# Patient Record
Sex: MALE | Race: WHITE | Employment: OTHER | URBAN - METROPOLITAN AREA
[De-identification: names, ages, dates, MRNs, and addresses within clinical notes are randomized per-mention and may not be internally consistent; named-entity substitution may affect disease eponyms.]

---

## 2017-01-27 ENCOUNTER — HOSPITAL ENCOUNTER (OUTPATIENT)
Dept: MRI IMAGING | Age: 72
Discharge: HOME | End: 2017-01-27
Attending: ORTHOPAEDIC SURGERY
Payer: MEDICARE

## 2017-01-27 DIAGNOSIS — M54.12 CERVICAL RADICULOPATHY: ICD-10-CM

## 2017-01-27 PROCEDURE — 72141 MRI NECK SPINE W/O DYE: CPT

## 2018-04-25 ENCOUNTER — OFFICE VISIT (OUTPATIENT)
Dept: INTERNAL MEDICINE CLINIC | Age: 73
End: 2018-04-25

## 2018-04-25 VITALS
OXYGEN SATURATION: 96 % | RESPIRATION RATE: 18 BRPM | BODY MASS INDEX: 25.4 KG/M2 | SYSTOLIC BLOOD PRESSURE: 116 MMHG | DIASTOLIC BLOOD PRESSURE: 63 MMHG | TEMPERATURE: 97.8 F | WEIGHT: 187.5 LBS | HEIGHT: 72 IN | HEART RATE: 82 BPM

## 2018-04-25 DIAGNOSIS — A69.20 LYME DISEASE: Primary | ICD-10-CM

## 2018-04-25 DIAGNOSIS — R76.8 POSITIVE LYME DISEASE SEROLOGY: ICD-10-CM

## 2018-04-25 RX ORDER — DOXYCYCLINE 100 MG/1
100 CAPSULE ORAL 2 TIMES DAILY
Qty: 56 CAP | Refills: 0 | Status: SHIPPED | OUTPATIENT
Start: 2018-04-25 | End: 2018-05-23

## 2018-04-25 RX ORDER — APIXABAN 5 MG/1
5 TABLET, FILM COATED ORAL 2 TIMES DAILY
COMMUNITY
Start: 2018-03-16

## 2018-04-25 RX ORDER — THYROID,PORK 32.5 MG
TABLET ORAL
Refills: 0 | COMMUNITY
Start: 2018-04-20 | End: 2019-05-16

## 2018-04-25 RX ORDER — PRASTERONE (DHEA) 25 MG
CAPSULE ORAL
Refills: 0 | COMMUNITY
Start: 2018-04-20 | End: 2019-05-16

## 2018-04-25 RX ORDER — FLUOCINONIDE 0.5 MG/G
CREAM TOPICAL
COMMUNITY
Start: 2018-02-06

## 2018-04-25 RX ORDER — FLUOCINONIDE 1 MG/G
CREAM TOPICAL
COMMUNITY
Start: 2018-02-08

## 2018-04-25 RX ORDER — TERBINAFINE HYDROCHLORIDE 250 MG/1
TABLET ORAL
Refills: 0 | COMMUNITY
Start: 2018-01-26

## 2018-04-25 NOTE — PROGRESS NOTES
RM 16      Chief Complaint   Patient presents with    New Patient     patient has lime disease        1. Have you been to the ER, urgent care clinic since your last visit? Hospitalized since your last visit? No    2. Have you seen or consulted any other health care providers outside of the [address] [place] [address] since your last visit? Include any pap smears or colon screening. Yes Reason for visit: Dr. Nikhil Veras, 3/10/18, lime disease     Health Maintenance Due   Topic Date Due    Hepatitis C Screening  1945    DTaP/Tdap/Td series (1 - Tdap) 07/23/1966    FOBT Q 1 YEAR AGE 50-75  07/23/1995    ZOSTER VACCINE AGE 60>  05/23/2005    GLAUCOMA SCREENING Q2Y  07/23/2010    Pneumococcal 65+ Low/Medium Risk (1 of 2 - PCV13) 07/23/2010    Influenza Age 9 to Adult  08/01/2017    MEDICARE YEARLY EXAM  03/20/2018     PHQ over the last two weeks 4/25/2018   Little interest or pleasure in doing things Not at all   Feeling down, depressed or hopeless Not at all   Total Score PHQ 2 0       Fall Risk Assessment, last 12 mths 4/25/2018   Able to walk? Yes   Fall in past 12 months?  No

## 2018-04-25 NOTE — MR AVS SNAPSHOT
[address] [facility] [address] 
[phone] Patient: Flex Doran MRN: [id number] OES:[birth year] Visit Information Date & Time Provider Department Dept. Phone Encounter #  
 4/25/2018  9:30 AM Jeannie Brunner, [facility] [phone] [id number] Follow-up Instructions Return if symptoms worsen or fail to improve. Upcoming Health Maintenance Date Due Hepatitis C Screening 1945 DTaP/Tdap/Td series (1 - Tdap) 7/23/1966 FOBT Q 1 YEAR AGE 50-75 7/23/1995 ZOSTER VACCINE AGE 60> 5/23/2005 GLAUCOMA SCREENING Q2Y 7/23/2010 Pneumococcal 65+ Low/Medium Risk (1 of 2 - PCV13) 7/23/2010 Influenza Age 5 to Adult 8/1/2017 MEDICARE YEARLY EXAM 3/20/2018 Allergies as of 4/25/2018  Review Complete On: 4/25/2018 By: Jeannie Brunner, MD  
 No Known Allergies Current Immunizations  Never Reviewed No immunizations on file. Not reviewed this visit You Were Diagnosed With   
  
 Codes Comments Lyme disease    -  Primary ICD-10-CM: A69.20 ICD-9-CM: 088.81 Positive Lyme disease serology     ICD-10-CM: R76.8 ICD-9-CM: 795.79 Vitals BP Pulse Temp Resp Height(growth percentile) Weight(growth percentile) 116/63 (BP 1 Location: Right arm, BP Patient Position: Sitting) 82 97.8 °F (36.6 °C) (Oral) 18 6' (1.829 m) 187 lb 8 oz (85 kg) SpO2 BMI Smoking Status 96% 25.43 kg/m2 Former Smoker BMI and BSA Data Body Mass Index Body Surface Area  
 25.43 kg/m 2 2.08 m 2 Preferred Pharmacy Pharmacy Name Phone [id number] [address] [phone] Your Updated Medication List  
  
   
This list is accurate as of 4/25/18 10:45 AM.  Always use your most recent med list. ALLEGRA PO Take  by mouth daily. aspirin 325 mg tablet Commonly known as:  ASPIRIN Take 325 mg by mouth daily. DHEA 25 mg Cap Generic drug:  prasterone (dhea) * dofetilide 250 mcg capsule Commonly known as:  Bethene Lente Take 250 mcg by mouth two (2) times a day. (with 125 mcg for total 375 mcg) * dofetilide 125 mcg capsule Commonly known as:  Bethene Lente Take 125 mcg by mouth two (2) times a day. (with 250 mcg for total 375 mcg) doxycycline 100 mg capsule Commonly known as:  VIBRAMYCIN Take 1 Cap by mouth two (2) times a day for 28 days. Fill if doxycycline monohydrate (Monodox) not covered. doxycycline 100 mg capsule Commonly known as:  Jovan Rice Take 1 Cap by mouth two (2) times a day for 28 days. ELIQUIS 5 mg tablet Generic drug:  apixaban  
  
 ferrous sulfate 325 mg (65 mg iron) tablet Take 325 mg by mouth two (2) times a day. FISH -1,000 mg capsule Generic drug:  fish oil-omega-3 fatty acids Take 1 Cap by mouth daily. FLONASE 50 mcg/actuation nasal spray Generic drug:  fluticasone 2 Sprays by Nasal route nightly. Administer to {[facility] RX NASAL each/right/left nostril:[id number]} nostril. * fluocinoNIDE 0.05 % topical cream  
Commonly known as:  LIDEX * fluocinonide 0.1 % topical cream  
Commonly known as:  VANOS  
  
 GLUCOSAMINE PO Take  by mouth daily. multivitamin tablet Commonly known as:  ONE A DAY Take 1 Tab by mouth daily. NATURE-THROID 32.5 mg tablet Generic drug:  thyroid (Pork) potassium chloride SR 10 mEq tablet Commonly known as:  KLOR-CON 10 Take 5 mEq by mouth daily. RESTASIS 0.05 % ophthalmic emulsion Generic drug:  cycloSPORINE Administer 1 Drop to both eyes two (2) times a day. sildenafil citrate 50 mg tablet Commonly known as:  VIAGRA Take 50 mg by mouth as needed. tavaborole 5 % Destiny  
by Apply Externally route daily. terbinafine HCl 250 mg tablet Commonly known as:  LAMISIL take 1 tablet by mouth once daily **HAVE LABWORK DRAWN AFTER DAY 45** * Notice: This list has 4 medication(s) that are the same as other medications prescribed for you. Read the directions carefully, and ask your doctor or other care provider to review them with you. Prescriptions Printed Refills  
 doxycycline (VIBRAMYCIN) 100 mg capsule 0 Sig: Take 1 Cap by mouth two (2) times a day for 28 days. Fill if doxycycline monohydrate (Monodox) not covered. Class: Print Route: Oral  
  
Prescriptions Sent to Pharmacy Refills  
 doxycycline (MONODOX) 100 mg capsule 0 Sig: Take 1 Cap by mouth two (2) times a day for 28 days. Class: Normal  
 [facility] [place] [address]  #: [phone] Route: Oral  
  
Follow-up Instructions Return if symptoms worsen or fail to improve. Patient Instructions 1. Complete at least 21 days of the doxycycline as reviewed. If the Monodox/monohydrate preparation is not covered, fill the doxycycline hyclate preparation instead (printed script). 2.  Will send copy of note to both PCP Dr. Nicci Corona and Dr. Roberta Adam as requested. 3.  Doxycycline Information/Reminders: Do not take Calcium-, Magnesium-, Aluminum-, or Iron-containing supplements (including OTC anti-acids, such as Tums, Maalox or Rolaids) or dairy products within 2 hours prior to or after the time the antibiotic medicine is taken--these compounds will keep the antibiotic from being absorbed. Doxycycline should be taken 2 hours prior to lying down/going to bed to prevent irritation of your esophagus from the medicine. Doxycycline makes you sun-sensitive--avoid sunlight and/or use sunscreen if exposed to the sun while taking the medicine. Typically take first daily dose between breakfast and lunch, and second daily dose between dinner and bedtime, to avoid food interactions. Introducing [facility]! [facility] introduces [email or web address] patient portal. Now you can access parts of your medical record, email your doctor's office, and request medication refills online. 1. In your internet browser, go to [email or web address] 2. Click on the First Time User? Click Here link in the Sign In box. You will see the New Member Sign Up page. 3. Enter your [email or web address] Access Code exactly as it appears below. You will not need to use this code after youve completed the sign-up process. If you do not sign up before the expiration date, you must request a new code. · [email or web address] Access Code: [email or web address] Expires: 7/24/2018 10:45 AM 
 
4. Enter the last four digits of your Social Security Number (xxxx) and Date of Birth (mm/dd/yyyy) as indicated and click Submit. You will be taken to the next sign-up page. 5. Create a [email or web address] ID. This will be your [email or web address] login ID and cannot be changed, so think of one that is secure and easy to remember. 6. Create a [email or web address] password. You can change your password at any time. 7. Enter your Password Reset Question and Answer. This can be used at a later time if you forget your password. 8. Enter your e-mail address. You will receive e-mail notification when new information is available in [address] 9. Click Sign Up. You can now view and download portions of your medical record. 10. Click the Download Summary menu link to download a portable copy of your medical information. If you have questions, please visit the Frequently Asked Questions section of the [email or web address] website. Remember, [email or web address] is NOT to be used for urgent needs. For medical emergencies, dial 911. Now available from your iPhone and Android! Please provide this summary of care documentation to your next provider. Your primary care clinician is listed as Sabra Us. If you have any questions after today's visit, please call [phone].

## 2018-04-25 NOTE — PATIENT INSTRUCTIONS
1.  Complete at least 21 days of the doxycycline as reviewed. If the Monodox/monohydrate preparation is not covered, fill the doxycycline hyclate preparation instead (printed script). 2.  Will send copy of note to both PCP Dr. Jose Alberto Becerra and Dr. Merly Cortes as requested. 3.  Doxycycline Information/Reminders:  Do not take Calcium-, Magnesium-, Aluminum-, or Iron-containing supplements (including OTC anti-acids, such as Tums, Maalox or Rolaids) or dairy products within 2 hours prior to or after the time the antibiotic medicine is taken--these compounds will keep the antibiotic from being absorbed. Doxycycline should be taken 2 hours prior to lying down/going to bed to prevent irritation of your esophagus from the medicine. Doxycycline makes you sun-sensitive--avoid sunlight and/or use sunscreen if exposed to the sun while taking the medicine. Typically take first daily dose between breakfast and lunch, and second daily dose between dinner and bedtime, to avoid food interactions.

## 2018-04-25 NOTE — PROGRESS NOTES
History of Present Illness:   Israel Tai is a 67 y.o. male here for evaluation:    Chief Complaint   Patient presents with    New Patient     patient has Lyme disease      Here a ID consult for recent dx of Lyme disease. He notes paperwork was to have been send over but not available to review at visit. He has copy of labs as reviewed below. He had labs with PCP on 2/28/18 with Lyme IgG (+) with confirmatory WB. His Lyme IgM was negative with negative WB. He notes done for chronic fatigue evaluation. He saw another provider for eval, and wants eval sent to them and PCP Dr. Milagro Franks. He has other labs with him at visit. --CRP 1.4 (normal <1.0). --Creat 1.37  --Normal sodium, AST, ALT. Normal globulin. --WBC 7.2; Hgb 12.1; Plt 157;  Neut 74%; Lymph 17%, EO 2%. Lyme testing was ordered as a direct Lyme Western Blot, without Ab screening. He notes remote history of negative Lyme testing--not sure when and not available to review. He reports multiple tick exposures from being outside in yard. He notes typically find prior to feeding/embedding/engorging. Notes sometimes attached/feeding. He has no history of Lyme-associated illness clinically. He has list of typical acute or chronic lyme manifestations/symptoms and denies any of these. Specifically:  --no history of acute or recurrent rash of ECM type. Only history skin itching  --no arthritis  --no neuropathy, CNS symptoms  --no history of meningoencephalitis. Notes memory problems, but these are not evaluated yet. Reviewed if evaluated by PCP, Neuro can consider implications of Lyme testing then, but would not treat for neuroborrelosis at this time with parenteral agents. He notes his arrhythmia is a fib and this has been managed effectively with his cardiologist.    Reviewed questions at visit.   Reviewed ideally would have RAVEN or other antibody screening prior to WB testing, but since testing for WB so strong with multiple bands (+), that a negative RAVEN test would not necessarily change recommended mgt below. Recommended therapy as below. He has no problems typically with abx. Past Medical History:   Diagnosis Date    Anemia     chronic \"most all my life\"    Arrhythmia     atrial tachycardia    Arthritis     CAD (coronary artery disease)     afib, mitral valve prolapse    Sleep apnea     uses BiPAP        Prior to Admission medications    Medication Sig Start Date End Date Taking? Authorizing Provider   ELIQUIS 5 mg tablet  3/16/18  Yes Historical Provider   fluocinoNIDE (LIDEX) 0.05 % topical cream  2/6/18  Yes Historical Provider   fluocinonide (VANOS) 0.1 % topical cream  2/8/18  Yes Historical Provider   DHEA 25 mg cap  4/20/18  Yes Historical Provider   NATURE-THROID 32.5 mg tablet  4/20/18  Yes Historical Provider   terbinafine HCl (LAMISIL) 250 mg tablet take 1 tablet by mouth once daily **HAVE LABWORK DRAWN AFTER DAY 45** 1/26/18  Yes Historical Provider   dofetilide (TIKOSYN) 250 mcg capsule Take 250 mcg by mouth two (2) times a day. (with 125 mcg for total 375 mcg)   Yes Historical Provider   dofetilide (TIKOSYN) 125 mcg capsule Take 125 mcg by mouth two (2) times a day. (with 250 mcg for total 375 mcg)   Yes Historical Provider   ferrous sulfate 325 mg (65 mg iron) tablet Take 325 mg by mouth two (2) times a day. Yes Historical Provider   multivitamin (ONE A DAY) tablet Take 1 Tab by mouth daily. Yes Historical Provider   tavaborole 5 % brando by Apply Externally route daily. Yes Historical Provider   potassium chloride SR (KLOR-CON 10) 10 mEq tablet Take 5 mEq by mouth daily. Yes Historical Provider   sildenafil citrate (VIAGRA) 50 mg tablet Take 50 mg by mouth as needed. Yes Historical Provider   cyclosporine (RESTASIS) 0.05 % ophthalmic emulsion Administer 1 Drop to both eyes two (2) times a day.    Yes Historical Provider   fluticasone (FLONASE) 50 mcg/Actuation nasal spray 2 Sprays by Nasal route nightly. Administer to  nostril. Yes Historical Provider   fish oil-omega-3 fatty acids (FISH OIL) 340-1,000 mg capsule Take 1 Cap by mouth daily. Yes Historical Provider   GLUCOSAMINE SULFATE (GLUCOSAMINE PO) Take  by mouth daily. Yes Historical Provider   FEXOFENADINE HCL (ALLEGRA PO) Take  by mouth daily. Yes Historical Provider   aspirin (ASPIRIN) 325 mg tablet Take 325 mg by mouth daily. Historical Provider        ROS  Complete ROS negative except as indicated in note. Vitals:    04/25/18 0924   BP: 116/63   Pulse: 82   Resp: 18   Temp: 97.8 °F (36.6 °C)   TempSrc: Oral   SpO2: 96%   Weight: 187 lb 8 oz (85 kg)   Height: 6' (1.829 m)   PainSc:   0 - No pain      Body mass index is 25.43 kg/(m^2). Physical Exam:     Physical Exam   Constitutional: He appears well-developed and well-nourished. No distress. HENT:   Head: Normocephalic and atraumatic. Mouth/Throat: Oropharynx is clear and moist. No oropharyngeal exudate. Eyes: Conjunctivae are normal. Right eye exhibits no discharge. Left eye exhibits no discharge. No scleral icterus. Neck: Normal range of motion. Neck supple. No tracheal deviation present. No thyromegaly present. Cardiovascular: Normal rate, regular rhythm, normal heart sounds and intact distal pulses. Exam reveals no gallop and no friction rub. No murmur heard. Pulmonary/Chest: Effort normal and breath sounds normal. No stridor. No respiratory distress. He has no wheezes. He has no rales. Abdominal: Soft. Bowel sounds are normal. He exhibits no distension. There is no tenderness. There is no rebound and no guarding. Musculoskeletal: He exhibits no edema or tenderness. No joint pain or large joint effusions noted. Lymphadenopathy:     He has no cervical adenopathy. Neurological: He is alert. He exhibits normal muscle tone. Coordination normal.   Skin: Skin is warm. No rash noted. He is not diaphoretic. No erythema. No pallor.    No rash noted (limited skin exam). Psychiatric: He has a normal mood and affect. His behavior is normal. Judgment and thought content normal.           Assessment and Plan:       ICD-10-CM ICD-9-CM    1. Lyme disease A69.20 088.81    2. Positive Lyme disease serology R76.8 795.79        1,2:  Since he has strong Western Blot (WB) evidence of prior Lyme infection without history of treatment or clinical illness associated with Lyme, recommend oral treatment with doxycycline. Reviewed Monodox or monohydrate preparation as preferred choice for better GI tolerance, since planning 28-day course of 100mg BID therapy, but Monodox not covered with electronic formulary decision support. Printed Doxy hyclate prep in case Monodox not covered/not affordable. He will fill instead as reviewed. Reviewed no further testing after treatment, as his serology will not change. Questions reviewed with pt at visit. Reviewed if problems with 28-day course, he can complete minimum 21 days. Reviewed alternatives PO if not tolerated. Reviewed if memory concerns evaluated, can have neuro evaluate in light of Lyme history, but do not think his mild intermittent joint pains or memory concerns at this time represent arthritis or neurologic manifestations of Lyme disease. Follow-up Disposition:  Return if symptoms worsen or fail to improve.  lab results and schedule of future lab studies reviewed with patient  reviewed medications and side effects in detail    For additional documentation of information and/or recommendations discussed this visit, please see notes in instructions. Plan and evaluation (above) reviewed with pt at visit  Patient voiced understanding of plan and provided with time to ask/review questions. After Visit Summary (AVS) provided to pt after visit with additional instructions as needed/reviewed. Pt requested note be sent to PCP Dr. Kimberlyn Flores once signed. Royer Benz

## 2018-11-29 ENCOUNTER — HOSPITAL ENCOUNTER (OUTPATIENT)
Dept: GENERAL RADIOLOGY | Age: 73
Discharge: HOME | End: 2018-11-29
Payer: MEDICARE

## 2018-11-29 DIAGNOSIS — M48.02 CERVICAL SPINAL STENOSIS: ICD-10-CM

## 2018-11-29 DIAGNOSIS — M48.02 FORAMINAL STENOSIS OF CERVICAL REGION: ICD-10-CM

## 2018-11-29 DIAGNOSIS — M54.2 NECK PAIN: ICD-10-CM

## 2018-11-29 PROCEDURE — 72040 X-RAY EXAM NECK SPINE 2-3 VW: CPT

## 2019-04-30 ENCOUNTER — HOSPITAL ENCOUNTER (OUTPATIENT)
Dept: CARDIAC REHAB | Age: 74
Discharge: HOME | End: 2019-04-30
Payer: MEDICARE

## 2019-04-30 VITALS — WEIGHT: 188.4 LBS | BODY MASS INDEX: 25.55 KG/M2

## 2019-04-30 PROCEDURE — 93798 PHYS/QHP OP CAR RHAB W/ECG: CPT

## 2019-04-30 NOTE — CARDIO/PULMONARY
Met with Mr. Pedro Silva for initia pulmonary visit. . Mr. Lanis Bumpers is a 68year old  patient of Dr. Astrid Taylor who presents with a Pulmonary Hypertension. History of arthritis and CAD. LVEF 56% 
  
Limitations to exercise are primarily related to deconditioning  
   
He currently is not exercising at home.  
   
He competed the 6 minute walk test on the treadmill, walking 310 meters, mets 2.5, RPE 11and RPD 0.  He denied any symptoms during his 6 min walk test  
   
Psychosocial: Pt scored a 3 on his PHQ9. He is a retired . He is  and enjoys gardening. He does have a son that can help him and also has a lawn service.   
  
Patient was given an overview of cardiac rehab program, placement of electrode/leads, and rationale for program. 
   
Heart rhythm on the monitor was A-fib with a BBB. Oxygen saturation was 97% on room air. Patient's identified goals are 1. To decrease shortness of breath. 2.  To gain more energy. I feel it would be beneficial to have patient on the cardiac monitor even though he is pulmonary.

## 2019-04-30 NOTE — CARDIO/PULMONARY
COPD Assessment Tool (CAT)     0-5 I never cough/I cough all the time       Score:0 I have no phlegm in my chest/ My chest is completely full of phlegm  Score: 0 My chest does not feel tight at all/ My chest feels very tight   Score:0 When I walk up a hill or stairs I am bot breathless/ 3 When I walk up a hill or stairs I am very breathless    Score:3 I am not limited doing any activities at home/ 
 I am very limited doing activities at home     Score:0 I am confident leaving my home despite my lung condition/ 
I am not at all confident leaving my home because of my lung condition Score:0 I sleep soundly/ I don't sleep because of my lung condition   Score:0 I have lots of energy/ I have no energy at all     Score:5            Total:8

## 2019-05-02 ENCOUNTER — HOSPITAL ENCOUNTER (OUTPATIENT)
Dept: CARDIAC REHAB | Age: 74
End: 2019-05-02

## 2019-05-07 ENCOUNTER — APPOINTMENT (OUTPATIENT)
Dept: CARDIAC REHAB | Age: 74
End: 2019-05-07

## 2019-05-09 ENCOUNTER — APPOINTMENT (OUTPATIENT)
Dept: CARDIAC REHAB | Age: 74
End: 2019-05-09

## 2019-05-14 ENCOUNTER — APPOINTMENT (OUTPATIENT)
Dept: CARDIAC REHAB | Age: 74
End: 2019-05-14

## 2019-05-16 ENCOUNTER — APPOINTMENT (OUTPATIENT)
Dept: CARDIAC REHAB | Age: 74
End: 2019-05-16

## 2019-05-21 ENCOUNTER — APPOINTMENT (OUTPATIENT)
Dept: CARDIAC REHAB | Age: 74
End: 2019-05-21

## 2019-05-23 ENCOUNTER — APPOINTMENT (OUTPATIENT)
Dept: CARDIAC REHAB | Age: 74
End: 2019-05-23

## 2019-05-28 ENCOUNTER — APPOINTMENT (OUTPATIENT)
Dept: CARDIAC REHAB | Age: 74
End: 2019-05-28

## 2019-05-30 ENCOUNTER — APPOINTMENT (OUTPATIENT)
Dept: CARDIAC REHAB | Age: 74
End: 2019-05-30

## 2019-06-04 ENCOUNTER — APPOINTMENT (OUTPATIENT)
Dept: CARDIAC REHAB | Age: 74
End: 2019-06-04
Payer: MEDICARE

## 2019-06-06 ENCOUNTER — APPOINTMENT (OUTPATIENT)
Dept: CARDIAC REHAB | Age: 74
End: 2019-06-06
Payer: MEDICARE

## 2019-06-10 ENCOUNTER — HOSPITAL ENCOUNTER (OUTPATIENT)
Dept: CARDIAC REHAB | Age: 74
Discharge: HOME | End: 2019-06-10
Payer: MEDICARE

## 2019-06-10 ENCOUNTER — APPOINTMENT (OUTPATIENT)
Dept: CARDIAC REHAB | Age: 74
End: 2019-06-10
Payer: MEDICARE

## 2019-06-10 VITALS — WEIGHT: 191.4 LBS | BODY MASS INDEX: 26.69 KG/M2

## 2019-06-10 PROCEDURE — G0237 THERAPEUTIC PROCD STRG ENDUR: HCPCS

## 2019-06-11 ENCOUNTER — APPOINTMENT (OUTPATIENT)
Dept: CARDIAC REHAB | Age: 74
End: 2019-06-11
Payer: MEDICARE

## 2019-06-13 ENCOUNTER — HOSPITAL ENCOUNTER (OUTPATIENT)
Dept: CARDIAC REHAB | Age: 74
Discharge: HOME | End: 2019-06-13
Payer: MEDICARE

## 2019-06-13 ENCOUNTER — APPOINTMENT (OUTPATIENT)
Dept: CARDIAC REHAB | Age: 74
End: 2019-06-13
Payer: MEDICARE

## 2019-06-13 PROCEDURE — G0239 OTH RESP PROC, GROUP: HCPCS

## 2019-06-17 ENCOUNTER — APPOINTMENT (OUTPATIENT)
Dept: CARDIAC REHAB | Age: 74
End: 2019-06-17
Payer: MEDICARE

## 2019-06-18 ENCOUNTER — HOSPITAL ENCOUNTER (OUTPATIENT)
Dept: CARDIAC REHAB | Age: 74
Discharge: HOME | End: 2019-06-18
Payer: MEDICARE

## 2019-06-18 ENCOUNTER — APPOINTMENT (OUTPATIENT)
Dept: CARDIAC REHAB | Age: 74
End: 2019-06-18
Payer: MEDICARE

## 2019-06-18 VITALS — WEIGHT: 190.6 LBS | BODY MASS INDEX: 26.58 KG/M2

## 2019-06-18 PROCEDURE — G0239 OTH RESP PROC, GROUP: HCPCS

## 2019-06-20 ENCOUNTER — APPOINTMENT (OUTPATIENT)
Dept: CARDIAC REHAB | Age: 74
End: 2019-06-20
Payer: MEDICARE

## 2019-06-20 ENCOUNTER — HOSPITAL ENCOUNTER (OUTPATIENT)
Dept: CARDIAC REHAB | Age: 74
Discharge: HOME | End: 2019-06-20
Payer: MEDICARE

## 2019-06-20 VITALS — WEIGHT: 189 LBS | BODY MASS INDEX: 26.36 KG/M2

## 2019-06-20 PROCEDURE — G0239 OTH RESP PROC, GROUP: HCPCS

## 2019-06-24 ENCOUNTER — APPOINTMENT (OUTPATIENT)
Dept: CARDIAC REHAB | Age: 74
End: 2019-06-24
Payer: MEDICARE

## 2019-06-25 ENCOUNTER — APPOINTMENT (OUTPATIENT)
Dept: CARDIAC REHAB | Age: 74
End: 2019-06-25
Payer: MEDICARE

## 2019-06-25 ENCOUNTER — HOSPITAL ENCOUNTER (OUTPATIENT)
Dept: CARDIAC REHAB | Age: 74
Discharge: HOME | End: 2019-06-25
Payer: MEDICARE

## 2019-06-25 VITALS — WEIGHT: 190 LBS | BODY MASS INDEX: 26.5 KG/M2

## 2019-06-25 PROCEDURE — G0239 OTH RESP PROC, GROUP: HCPCS

## 2019-06-27 ENCOUNTER — HOSPITAL ENCOUNTER (OUTPATIENT)
Dept: CARDIAC REHAB | Age: 74
End: 2019-06-27
Payer: MEDICARE

## 2019-06-27 ENCOUNTER — APPOINTMENT (OUTPATIENT)
Dept: CARDIAC REHAB | Age: 74
End: 2019-06-27
Payer: MEDICARE

## 2019-06-27 ENCOUNTER — HOSPITAL ENCOUNTER (OUTPATIENT)
Dept: CARDIAC REHAB | Age: 74
Discharge: HOME | End: 2019-06-27
Payer: MEDICARE

## 2019-06-27 VITALS — BODY MASS INDEX: 26.25 KG/M2 | WEIGHT: 188.2 LBS

## 2019-06-27 PROCEDURE — G0239 OTH RESP PROC, GROUP: HCPCS

## 2019-07-01 ENCOUNTER — APPOINTMENT (OUTPATIENT)
Dept: CARDIAC REHAB | Age: 74
End: 2019-07-01
Payer: MEDICARE

## 2019-07-08 ENCOUNTER — APPOINTMENT (OUTPATIENT)
Dept: CARDIAC REHAB | Age: 74
End: 2019-07-08
Payer: MEDICARE

## 2019-07-09 ENCOUNTER — HOSPITAL ENCOUNTER (OUTPATIENT)
Dept: CARDIAC REHAB | Age: 74
Discharge: HOME | End: 2019-07-09
Payer: MEDICARE

## 2019-07-09 VITALS — WEIGHT: 192.4 LBS | BODY MASS INDEX: 26.83 KG/M2

## 2019-07-09 PROCEDURE — G0239 OTH RESP PROC, GROUP: HCPCS

## 2019-07-11 ENCOUNTER — HOSPITAL ENCOUNTER (OUTPATIENT)
Dept: CARDIAC REHAB | Age: 74
Discharge: HOME | End: 2019-07-11
Payer: MEDICARE

## 2019-07-11 VITALS — WEIGHT: 182.8 LBS | BODY MASS INDEX: 25.5 KG/M2

## 2019-07-11 PROCEDURE — G0239 OTH RESP PROC, GROUP: HCPCS

## 2019-07-15 ENCOUNTER — APPOINTMENT (OUTPATIENT)
Dept: CARDIAC REHAB | Age: 74
End: 2019-07-15
Payer: MEDICARE

## 2019-07-16 ENCOUNTER — HOSPITAL ENCOUNTER (OUTPATIENT)
Dept: CARDIAC REHAB | Age: 74
Discharge: HOME | End: 2019-07-16
Payer: MEDICARE

## 2019-07-16 VITALS — WEIGHT: 190.8 LBS | BODY MASS INDEX: 26.61 KG/M2

## 2019-07-16 PROCEDURE — G0239 OTH RESP PROC, GROUP: HCPCS

## 2019-07-18 ENCOUNTER — HOSPITAL ENCOUNTER (OUTPATIENT)
Dept: CARDIAC REHAB | Age: 74
Discharge: HOME | End: 2019-07-18
Payer: MEDICARE

## 2019-07-18 VITALS — WEIGHT: 191.4 LBS | BODY MASS INDEX: 26.69 KG/M2

## 2019-07-18 PROCEDURE — G0239 OTH RESP PROC, GROUP: HCPCS

## 2019-07-22 ENCOUNTER — APPOINTMENT (OUTPATIENT)
Dept: CARDIAC REHAB | Age: 74
End: 2019-07-22
Payer: MEDICARE

## 2019-07-23 ENCOUNTER — HOSPITAL ENCOUNTER (OUTPATIENT)
Dept: CARDIAC REHAB | Age: 74
Discharge: HOME | End: 2019-07-23
Payer: MEDICARE

## 2019-07-23 VITALS — WEIGHT: 190.4 LBS | BODY MASS INDEX: 26.56 KG/M2

## 2019-07-23 PROCEDURE — G0239 OTH RESP PROC, GROUP: HCPCS

## 2019-07-25 ENCOUNTER — APPOINTMENT (OUTPATIENT)
Dept: CARDIAC REHAB | Age: 74
End: 2019-07-25
Payer: MEDICARE

## 2019-07-25 ENCOUNTER — HOSPITAL ENCOUNTER (OUTPATIENT)
Dept: CARDIAC REHAB | Age: 74
Discharge: HOME | End: 2019-07-25
Payer: MEDICARE

## 2019-07-25 VITALS — BODY MASS INDEX: 26.5 KG/M2 | WEIGHT: 190 LBS

## 2019-07-25 PROCEDURE — G0239 OTH RESP PROC, GROUP: HCPCS

## 2019-07-29 ENCOUNTER — APPOINTMENT (OUTPATIENT)
Dept: CARDIAC REHAB | Age: 74
End: 2019-07-29
Payer: MEDICARE

## 2019-08-01 ENCOUNTER — APPOINTMENT (OUTPATIENT)
Dept: CARDIAC REHAB | Age: 74
End: 2019-08-01
Payer: MEDICARE

## 2019-08-05 ENCOUNTER — APPOINTMENT (OUTPATIENT)
Dept: CARDIAC REHAB | Age: 74
End: 2019-08-05
Payer: MEDICARE

## 2019-08-06 ENCOUNTER — HOSPITAL ENCOUNTER (OUTPATIENT)
Dept: CARDIAC REHAB | Age: 74
Discharge: HOME | End: 2019-08-06
Payer: MEDICARE

## 2019-08-06 VITALS — WEIGHT: 189.4 LBS | BODY MASS INDEX: 26.42 KG/M2

## 2019-08-06 PROCEDURE — G0239 OTH RESP PROC, GROUP: HCPCS

## 2019-08-08 ENCOUNTER — HOSPITAL ENCOUNTER (OUTPATIENT)
Dept: CARDIAC REHAB | Age: 74
Discharge: HOME | End: 2019-08-08
Payer: MEDICARE

## 2019-08-08 VITALS — WEIGHT: 186.8 LBS | BODY MASS INDEX: 26.05 KG/M2

## 2019-08-08 PROCEDURE — G0239 OTH RESP PROC, GROUP: HCPCS

## 2019-08-13 ENCOUNTER — HOSPITAL ENCOUNTER (OUTPATIENT)
Dept: CARDIAC REHAB | Age: 74
Discharge: HOME | End: 2019-08-13
Payer: MEDICARE

## 2019-08-13 VITALS — BODY MASS INDEX: 26.36 KG/M2 | WEIGHT: 189 LBS

## 2019-08-13 PROCEDURE — G0239 OTH RESP PROC, GROUP: HCPCS

## 2019-08-15 ENCOUNTER — HOSPITAL ENCOUNTER (OUTPATIENT)
Dept: CARDIAC REHAB | Age: 74
Discharge: HOME | End: 2019-08-15
Payer: MEDICARE

## 2019-08-15 VITALS — BODY MASS INDEX: 26.69 KG/M2 | WEIGHT: 191.4 LBS

## 2019-08-15 PROCEDURE — G0239 OTH RESP PROC, GROUP: HCPCS

## 2019-08-20 ENCOUNTER — HOSPITAL ENCOUNTER (OUTPATIENT)
Dept: CARDIAC REHAB | Age: 74
Discharge: HOME | End: 2019-08-20
Payer: MEDICARE

## 2019-08-20 VITALS — BODY MASS INDEX: 26.5 KG/M2 | WEIGHT: 190 LBS

## 2019-08-20 PROCEDURE — G0239 OTH RESP PROC, GROUP: HCPCS

## 2019-08-22 ENCOUNTER — HOSPITAL ENCOUNTER (OUTPATIENT)
Dept: CARDIAC REHAB | Age: 74
Discharge: HOME | End: 2019-08-22
Payer: MEDICARE

## 2019-08-22 VITALS — WEIGHT: 192.2 LBS | BODY MASS INDEX: 26.81 KG/M2

## 2019-08-22 PROCEDURE — G0239 OTH RESP PROC, GROUP: HCPCS

## 2019-08-27 ENCOUNTER — HOSPITAL ENCOUNTER (OUTPATIENT)
Dept: CARDIAC REHAB | Age: 74
Discharge: HOME | End: 2019-08-27
Payer: MEDICARE

## 2019-08-27 VITALS — WEIGHT: 190.8 LBS | BODY MASS INDEX: 26.61 KG/M2

## 2019-08-27 PROCEDURE — G0239 OTH RESP PROC, GROUP: HCPCS

## 2019-08-29 ENCOUNTER — APPOINTMENT (OUTPATIENT)
Dept: CARDIAC REHAB | Age: 74
End: 2019-08-29
Payer: MEDICARE

## 2019-09-03 ENCOUNTER — HOSPITAL ENCOUNTER (OUTPATIENT)
Dept: CARDIAC REHAB | Age: 74
Discharge: HOME | End: 2019-09-03
Payer: MEDICARE

## 2019-09-03 VITALS — WEIGHT: 192.8 LBS | BODY MASS INDEX: 26.89 KG/M2

## 2019-09-03 PROCEDURE — G0239 OTH RESP PROC, GROUP: HCPCS

## 2019-09-05 ENCOUNTER — HOSPITAL ENCOUNTER (OUTPATIENT)
Dept: CARDIAC REHAB | Age: 74
Discharge: HOME | End: 2019-09-05
Payer: MEDICARE

## 2019-09-05 VITALS — WEIGHT: 192.4 LBS | BODY MASS INDEX: 26.83 KG/M2

## 2019-09-05 PROCEDURE — G0239 OTH RESP PROC, GROUP: HCPCS

## 2019-09-10 ENCOUNTER — HOSPITAL ENCOUNTER (OUTPATIENT)
Dept: CARDIAC REHAB | Age: 74
Discharge: HOME | End: 2019-09-10
Payer: MEDICARE

## 2019-09-10 ENCOUNTER — APPOINTMENT (OUTPATIENT)
Dept: GENERAL RADIOLOGY | Age: 74
End: 2019-09-10
Attending: EMERGENCY MEDICINE
Payer: MEDICARE

## 2019-09-10 ENCOUNTER — HOSPITAL ENCOUNTER (EMERGENCY)
Age: 74
Discharge: HOME | End: 2019-09-10
Attending: EMERGENCY MEDICINE
Payer: MEDICARE

## 2019-09-10 VITALS
HEIGHT: 72 IN | OXYGEN SATURATION: 100 % | SYSTOLIC BLOOD PRESSURE: 138 MMHG | RESPIRATION RATE: 16 BRPM | DIASTOLIC BLOOD PRESSURE: 58 MMHG | TEMPERATURE: 97.4 F | BODY MASS INDEX: 25.68 KG/M2 | HEART RATE: 73 BPM | WEIGHT: 189.6 LBS

## 2019-09-10 DIAGNOSIS — R07.9 CHEST PAIN, UNSPECIFIED TYPE: Primary | ICD-10-CM

## 2019-09-10 LAB
ALBUMIN SERPL-MCNC: 4.1 G/DL (ref 3.5–5)
ALBUMIN/GLOB SERPL: 1.2 {RATIO} (ref 1.1–2.2)
ALP SERPL-CCNC: 112 U/L (ref 45–117)
ALT SERPL-CCNC: 34 U/L (ref 12–78)
ANION GAP SERPL CALC-SCNC: 5 MMOL/L (ref 5–15)
AST SERPL-CCNC: 23 U/L (ref 15–37)
BASOPHILS # BLD: 0 K/UL (ref 0–0.1)
BASOPHILS NFR BLD: 1 % (ref 0–1)
BILIRUB SERPL-MCNC: 2.2 MG/DL (ref 0.2–1)
BUN SERPL-MCNC: 22 MG/DL (ref 6–20)
BUN/CREAT SERPL: 17 (ref 12–20)
CALCIUM SERPL-MCNC: 9 MG/DL (ref 8.5–10.1)
CHLORIDE SERPL-SCNC: 105 MMOL/L (ref 97–108)
CO2 SERPL-SCNC: 29 MMOL/L (ref 21–32)
CREAT SERPL-MCNC: 1.28 MG/DL (ref 0.7–1.3)
DIFFERENTIAL METHOD BLD: ABNORMAL
EOSINOPHIL # BLD: 0.1 K/UL (ref 0–0.4)
EOSINOPHIL NFR BLD: 2 % (ref 0–7)
ERYTHROCYTE [DISTWIDTH] IN BLOOD BY AUTOMATED COUNT: 13.4 % (ref 11.5–14.5)
GLOBULIN SER CALC-MCNC: 3.3 G/DL (ref 2–4)
GLUCOSE SERPL-MCNC: 88 MG/DL (ref 65–100)
HCT VFR BLD AUTO: 35.2 % (ref 36.6–50.3)
HGB BLD-MCNC: 11.7 G/DL (ref 12.1–17)
IMM GRANULOCYTES # BLD AUTO: 0 K/UL (ref 0–0.04)
IMM GRANULOCYTES NFR BLD AUTO: 0 % (ref 0–0.5)
LYMPHOCYTES # BLD: 0.8 K/UL (ref 0.8–3.5)
LYMPHOCYTES NFR BLD: 14 % (ref 12–49)
MAGNESIUM SERPL-MCNC: 2.1 MG/DL (ref 1.6–2.4)
MCH RBC QN AUTO: 32.4 PG (ref 26–34)
MCHC RBC AUTO-ENTMCNC: 33.2 G/DL (ref 30–36.5)
MCV RBC AUTO: 97.5 FL (ref 80–99)
MONOCYTES # BLD: 0.4 K/UL (ref 0–1)
MONOCYTES NFR BLD: 7 % (ref 5–13)
NEUTS SEG # BLD: 4.6 K/UL (ref 1.8–8)
NEUTS SEG NFR BLD: 76 % (ref 32–75)
NRBC # BLD: 0 K/UL (ref 0–0.01)
NRBC BLD-RTO: 0 PER 100 WBC
PLATELET # BLD AUTO: 115 K/UL (ref 150–400)
PMV BLD AUTO: 10.2 FL (ref 8.9–12.9)
POTASSIUM SERPL-SCNC: 4 MMOL/L (ref 3.5–5.1)
PROT SERPL-MCNC: 7.4 G/DL (ref 6.4–8.2)
RBC # BLD AUTO: 3.61 M/UL (ref 4.1–5.7)
SODIUM SERPL-SCNC: 139 MMOL/L (ref 136–145)
TROPONIN I SERPL-MCNC: <0.05 NG/ML
TROPONIN I SERPL-MCNC: <0.05 NG/ML
WBC # BLD AUTO: 6 K/UL (ref 4.1–11.1)

## 2019-09-10 PROCEDURE — 93005 ELECTROCARDIOGRAM TRACING: CPT

## 2019-09-10 PROCEDURE — 83735 ASSAY OF MAGNESIUM: CPT

## 2019-09-10 PROCEDURE — 36415 COLL VENOUS BLD VENIPUNCTURE: CPT

## 2019-09-10 PROCEDURE — G0239 OTH RESP PROC, GROUP: HCPCS

## 2019-09-10 PROCEDURE — 85025 COMPLETE CBC W/AUTO DIFF WBC: CPT

## 2019-09-10 PROCEDURE — 99284 EMERGENCY DEPT VISIT MOD MDM: CPT

## 2019-09-10 PROCEDURE — 80053 COMPREHEN METABOLIC PANEL: CPT

## 2019-09-10 PROCEDURE — 84484 ASSAY OF TROPONIN QUANT: CPT

## 2019-09-10 PROCEDURE — 71046 X-RAY EXAM CHEST 2 VIEWS: CPT

## 2019-09-10 NOTE — ED NOTES
Report received from Briseida Cueto [facility]. They advised of the patient's chief complaint, current status, orders completed (to include IV access/medications/radiology testing), outstanding orders that still need to be completed, and the treatment plan. Questions asked and answered prior to assumption of care.

## 2019-09-10 NOTE — DISCHARGE INSTRUCTIONS

## 2019-09-10 NOTE — ED TRIAGE NOTES
Had episode of mild chest pain yesterday which resolved. Went to cardiac rehab today and had another episode of chest pain. Hx Afib and cardiomyopathy so sent to ED for further evaluation.   Slight left sided chest pain

## 2019-09-10 NOTE — ED PROVIDER NOTES
EMERGENCY DEPARTMENT HISTORY AND PHYSICAL EXAM      Date: 9/10/2019  Patient Name: Rashmi Bishop    History of Presenting Illness     Chief Complaint   Patient presents with    Chest Pain       History Provided By: Patient    HPI: Rashmi Bishop, 76 y.o. male with PMHx as noted below presents the emergency department with chief complaint of chest pain. Patient states that he has had intermittent left-sided chest pain since yesterday that is worse with exertion. Patient noted the pain again returned during cardiac rehab just prior to arrival and has been constant since. He was sent here from cardiac rehab for evaluation. Otherwise notes no shortness of breath, radiation of symptoms, nausea, vomiting, diaphoresis. Patient describes the pain as a mild dull ache.       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 Seng Lima Sleep apnea     uses BiPAP    SOB (shortness of breath)     chronic; dyspnea on exertion; Dr. Mickey Mercer       Past Surgical History:  Past Surgical History:   Procedure Laterality Date    CARDIAC SURG PROCEDURE UNLIST  ~2018    cardiac ablation    CARDIAC SURG PROCEDURE UNLIST  ~2013    cardiac ablation    HX BACK SURGERY      disc's removed at age 34    HX BACK SURGERY      pilonidal cyst removed    HX CARPAL TUNNEL RELEASE Bilateral 2017    HX CATARACT REMOVAL      with implants bilaterally    HX HERNIA REPAIR      hiatal hernia repair    HX ORTHOPAEDIC      x2 right knee, x1 left knee    HX ORTHOPAEDIC      x3 left hand     HX RHINOPLASTY      for deviated septum    HX SHOULDER ARTHROSCOPY      x2 right     HX TONSILLECTOMY      HX VASECTOMY         Family History:  Family History   Problem Relation Age of Onset    Kidney Disease Father     Colon Cancer Maternal Grandfather     Collagen Dis Maternal Grandfather     Colon Polyps Paternal Grandmother     Clotting Disorder Paternal Grandmother        Social History:  Social History     Tobacco Use    Smoking status: Former Smoker     Packs/day: 2.00     Years: 10.00     Pack years: 20.00     Last attempt to quit: 1972     Years since quittin.1    Smokeless tobacco: Never Used   Substance Use Topics    Alcohol use: Yes     Alcohol/week: 4.0 standard drinks     Types: 4 Cans of beer per week    Drug use: Not Currently       Allergies:  No Known Allergies      Review of Systems   Review of Systems  Constitutional: Negative for fever, chills, and fatigue. HENT: Negative for congestion, sore throat, rhinorrhea, sneezing and neck stiffness   Eyes: Negative for discharge and redness. Respiratory: Negative for  shortness of breath, wheezing   Cardiovascular: Positive for chest pain, palpitations   Gastrointestinal: Negative for nausea, vomiting, abdominal pain, constipation, diarrhea and blood in stool. Genitourinary: Negative for dysuria, hematuria, flank pain, decreased urine volume, discharge,   Musculoskeletal: Negative for myalgias or joint pain . Skin: Negative for rash or lesions . Neurological: Negative weakness, light-headedness, numbness and headaches. Physical Exam   Physical Exam    GENERAL: alert and oriented, no acute distress  EYES: PEERL, No injection, discharge or icterus. ENT: Mucous membranes pink and moist.  NECK: Supple  LUNGS: Airway patent. Non-labored respirations.  Breath sounds clear with good air entry bilaterally. HEART: Regular rate irreg irreg rhythm. No peripheral edema  ABDOMEN: Non-distended and non-tender, without guarding or rebound. SKIN:  warm, dry  MSK/EXTREMITIES: Without swelling, tenderness or deformity, symmetric with normal ROM  NEUROLOGICAL: Alert, oriented      Diagnostic Study Results     Labs -     No results found for this or any previous visit (from the past 12 hour(s)). Radiologic Studies -   XR CHEST PA LAT   Final Result   IMPRESSION: Moderate cardiomegaly. Clear lungs. CT Results  (Last 48 hours)    None        CXR Results  (Last 48 hours)               09/10/19 1556  XR CHEST PA LAT Final result    Impression:  IMPRESSION: Moderate cardiomegaly. Clear lungs. Narrative: Indication:  chest pain        Exam: PA and lateral views of the chest.       Direct comparison is made to prior CXR dated 7/2011. Findings: Cardiomediastinal silhouette is moderately enlarged, increased   compared to prior CXR dated 7/2011. Lungs are clear bilaterally. There is no   pleural fluid. There is no pneumothorax. Medical Decision Making   I am the first provider for this patient. I reviewed the vital signs, available nursing notes, past medical history, past surgical history, family history and social history. Vital Signs-Reviewed the patient's vital signs. EKG interpretation: (Preliminary)  Rhythm: afib . Rate (approx.): 64 Axis: normal; P wave: normal; QRS interval: normal ; ST/T wave: no acute ischemic changes;  was interpreted by Jersey Tijerina MD,ED Provider. Records Reviewed: Nursing Notes and Old Medical Records    Provider Notes (Medical Decision Making): On presentation the patient is well appearing, in no acute distress with reassurnig vital signs. Based on the history and exam the differential diagnosis for this patient includes ACS. MSK chest pain, pleurisy, costochondritis, bronchitis, GERD, esophageal spasm.      The pt is unlikely to have PE or aortic dissection as pain resolved in ED so will not pursue workup in an asx patient. Will pursue cardiac work-up with EKG, troponin x 2, CXR and cardiology consult. While the pt is less likely to have pneumonia as there is no cough and no fever, and less likely to have ptx, will order CXR to assess lung fields       PROGRESS  I have re-examined the patient and the patient denies any new or changing symptoms. The patient has had 2 negative troponins at this time and an EKG without any signs of acute ischemia. While work-up is reassuring, patient's story was concerning with exertional chest pain so I did discuss admission for further cardiac work-up and inpatient evaluation however patient would like to decline admission at this time in favor of outpatient management. Patient understands that we cannot fully rule out cardiac disease in the emergency department today. The diagnosis, follow up, return instructions, test esults, x-rays and medications have been discussed and reviewed with the patient. The patient has been given the opportunity to ask questions. The patient expresses understanding of the diagnosis, follow-up and return instructions. The patient agrees to follow up with cardiology as directed and to return immediately if the chest pain returns/worsens. The patient expresses understanding that although cardiac testing at this time is negative, a cardiac problem could still be present and that a follow-up appointment with a cardiologist for further evaluation and risk factor modification is necessary to complete the evaluation of this complaint. Zakc Farrar MD.        ED Course:   Initial assessment performed. The patients presenting problems have been discussed, and they are in agreement with the care plan formulated and outlined with them. I have encouraged them to ask questions as they arise throughout their visit.      CONSULT: discussed case with  Pelon Matta, cardiology. Reviewed EKG: And work-up. They agreed with work-up thus far and will see patient in clinic    PROGRESS NOTE:  The patient has been re-evaluated and is ready for discharge. Reviewed available results with patient and have counseled them on diagnosis and care plan. They have expressed understanding, and all their questions have been answered. They agree with plan and agree to have pt F/U as recommended, or return to the ED if their sxs worsen. Discharge instructions have been provided and explained to them, along with reasons to have pt return to the ED. The patient is amenable to discharge so will discharge pt at this time    Madhu Bernard MD        Disposition:  home    PLAN:  1. Discharge Medication List as of 9/10/2019  7:50 PM        2. Follow-up Information     Follow up With Specialties Details Why Contact Info    Sunil Robison MD Family Practice Schedule an appointment as soon as possible for a visit in 2 days  [address]      Ceci Gonzalez MD Cardiology Schedule an appointment as soon as possible for a visit in 1 day  [address]  [phone]          Return to ED if worse     Diagnosis     Clinical Impression:   1.  Chest pain, unspecified type

## 2019-09-10 NOTE — CARDIO/PULMONARY
Patient with complaint of 3/10 chest pain while working out. This has happened several times over the past few days. Work out stopped. Blood pressure 130/70. Pain did not subside with rest. Patient has appointment with Dr. Pablo River on Thursday 9-. Patient's rhythm was A-fib with PVC's. Due to patient's extensive cardiac history,  to include cardiomyopathy, it was recommended for him to go the Emergency room to be checked out. Patient agreed.

## 2019-09-11 LAB
ATRIAL RATE: 202 BPM
ATRIAL RATE: 75 BPM
CALCULATED R AXIS, ECG10: 82 DEGREES
CALCULATED R AXIS, ECG10: 94 DEGREES
CALCULATED T AXIS, ECG11: 8 DEGREES
DIAGNOSIS, 93000: NORMAL
DIAGNOSIS, 93000: NORMAL
Q-T INTERVAL, ECG07: 442 MS
Q-T INTERVAL, ECG07: 470 MS
QRS DURATION, ECG06: 98 MS
QRS DURATION, ECG06: 98 MS
QTC CALCULATION (BEZET), ECG08: 473 MS
QTC CALCULATION (BEZET), ECG08: 484 MS
VENTRICULAR RATE, ECG03: 64 BPM
VENTRICULAR RATE, ECG03: 69 BPM

## 2019-09-12 ENCOUNTER — APPOINTMENT (OUTPATIENT)
Dept: CARDIAC REHAB | Age: 74
End: 2019-09-12
Payer: MEDICARE

## 2019-09-19 ENCOUNTER — APPOINTMENT (OUTPATIENT)
Dept: CARDIAC REHAB | Age: 74
End: 2019-09-19
Payer: MEDICARE

## 2019-10-03 ENCOUNTER — HOSPITAL ENCOUNTER (OUTPATIENT)
Dept: CARDIAC REHAB | Age: 74
Discharge: HOME | End: 2019-10-03
Payer: MEDICARE

## 2019-10-03 VITALS — WEIGHT: 190.8 LBS | BODY MASS INDEX: 25.88 KG/M2

## 2019-10-03 PROCEDURE — G0239 OTH RESP PROC, GROUP: HCPCS

## 2019-10-03 PROCEDURE — 93798 PHYS/QHP OP CAR RHAB W/ECG: CPT

## 2019-10-04 ENCOUNTER — HOSPITAL ENCOUNTER (OUTPATIENT)
Dept: CARDIAC REHAB | Age: 74
Discharge: HOME | End: 2019-10-04
Payer: MEDICARE

## 2019-10-04 VITALS — WEIGHT: 193.8 LBS | BODY MASS INDEX: 26.28 KG/M2

## 2019-10-04 PROCEDURE — G0239 OTH RESP PROC, GROUP: HCPCS

## 2019-10-17 ENCOUNTER — HOSPITAL ENCOUNTER (OUTPATIENT)
Dept: CARDIAC REHAB | Age: 74
Discharge: HOME | End: 2019-10-17
Payer: MEDICARE

## 2019-10-17 VITALS — WEIGHT: 190.8 LBS | BODY MASS INDEX: 25.88 KG/M2

## 2019-10-17 PROCEDURE — G0239 OTH RESP PROC, GROUP: HCPCS

## 2019-10-24 ENCOUNTER — APPOINTMENT (OUTPATIENT)
Dept: CARDIAC REHAB | Age: 74
End: 2019-10-24
Payer: MEDICARE

## 2019-10-25 ENCOUNTER — HOSPITAL ENCOUNTER (OUTPATIENT)
Dept: CARDIAC REHAB | Age: 74
Discharge: HOME | End: 2019-10-25
Payer: MEDICARE

## 2019-10-25 VITALS — WEIGHT: 190.4 LBS | BODY MASS INDEX: 25.82 KG/M2

## 2019-10-25 PROCEDURE — G0424 PULMONARY REHAB W EXER: HCPCS

## 2019-10-25 PROCEDURE — G0239 OTH RESP PROC, GROUP: HCPCS

## 2019-10-31 ENCOUNTER — HOSPITAL ENCOUNTER (OUTPATIENT)
Dept: CARDIAC REHAB | Age: 74
Discharge: HOME | End: 2019-10-31
Payer: MEDICARE

## 2019-10-31 VITALS — BODY MASS INDEX: 25.85 KG/M2 | WEIGHT: 190.6 LBS

## 2019-10-31 PROCEDURE — G0239 OTH RESP PROC, GROUP: HCPCS

## 2019-11-01 ENCOUNTER — APPOINTMENT (OUTPATIENT)
Dept: CARDIAC REHAB | Age: 74
End: 2019-11-01
Payer: MEDICARE

## 2019-11-04 ENCOUNTER — HOSPITAL ENCOUNTER (OUTPATIENT)
Dept: CARDIAC REHAB | Age: 74
Discharge: HOME | End: 2019-11-04
Payer: MEDICARE

## 2019-11-04 VITALS — WEIGHT: 191 LBS | BODY MASS INDEX: 25.9 KG/M2

## 2019-11-04 PROCEDURE — G0239 OTH RESP PROC, GROUP: HCPCS

## 2019-11-07 ENCOUNTER — HOSPITAL ENCOUNTER (OUTPATIENT)
Dept: CARDIAC REHAB | Age: 74
Discharge: HOME | End: 2019-11-07
Payer: MEDICARE

## 2019-11-07 VITALS — WEIGHT: 188.8 LBS | BODY MASS INDEX: 25.61 KG/M2

## 2019-11-07 PROCEDURE — G0239 OTH RESP PROC, GROUP: HCPCS

## 2019-11-12 ENCOUNTER — HOSPITAL ENCOUNTER (OUTPATIENT)
Dept: CARDIAC REHAB | Age: 74
Discharge: HOME | End: 2019-11-12
Payer: MEDICARE

## 2019-11-12 VITALS — WEIGHT: 192.8 LBS | BODY MASS INDEX: 26.15 KG/M2

## 2019-11-12 PROCEDURE — G0239 OTH RESP PROC, GROUP: HCPCS

## 2019-11-14 ENCOUNTER — HOSPITAL ENCOUNTER (OUTPATIENT)
Dept: CARDIAC REHAB | Age: 74
Discharge: HOME | End: 2019-11-14
Payer: MEDICARE

## 2019-11-14 VITALS — WEIGHT: 190.2 LBS | BODY MASS INDEX: 25.8 KG/M2

## 2019-11-14 PROCEDURE — G0239 OTH RESP PROC, GROUP: HCPCS

## 2019-11-19 ENCOUNTER — HOSPITAL ENCOUNTER (OUTPATIENT)
Dept: CARDIAC REHAB | Age: 74
Discharge: HOME | End: 2019-11-19
Payer: MEDICARE

## 2019-11-19 VITALS — BODY MASS INDEX: 26.34 KG/M2 | WEIGHT: 194.2 LBS

## 2019-11-19 PROCEDURE — G0239 OTH RESP PROC, GROUP: HCPCS

## 2019-11-21 ENCOUNTER — HOSPITAL ENCOUNTER (OUTPATIENT)
Dept: CARDIAC REHAB | Age: 74
End: 2019-11-21
Payer: MEDICARE

## 2019-11-25 ENCOUNTER — HOSPITAL ENCOUNTER (OUTPATIENT)
Dept: CARDIAC REHAB | Age: 74
Discharge: HOME | End: 2019-11-25
Payer: MEDICARE

## 2019-11-25 VITALS — WEIGHT: 193.6 LBS | BODY MASS INDEX: 26.26 KG/M2

## 2019-11-25 PROCEDURE — G0239 OTH RESP PROC, GROUP: HCPCS

## 2019-12-03 ENCOUNTER — HOSPITAL ENCOUNTER (OUTPATIENT)
Dept: CARDIAC REHAB | Age: 74
Discharge: HOME | End: 2019-12-03
Payer: MEDICARE

## 2019-12-03 VITALS — BODY MASS INDEX: 26.04 KG/M2 | WEIGHT: 192 LBS

## 2019-12-03 PROCEDURE — G0239 OTH RESP PROC, GROUP: HCPCS

## 2019-12-06 ENCOUNTER — HOSPITAL ENCOUNTER (OUTPATIENT)
Dept: CARDIAC REHAB | Age: 74
Discharge: HOME | End: 2019-12-06
Payer: MEDICARE

## 2019-12-06 VITALS — WEIGHT: 189 LBS | BODY MASS INDEX: 25.63 KG/M2

## 2019-12-06 PROCEDURE — 94618 PULMONARY STRESS TESTING: CPT

## 2019-12-06 NOTE — CARDIO/PULMONARY
COPD Assessment Tool (CAT)     0-5           I never cough/I cough all the time       Score:  0      I have no phlegm in my chest/ My chest is completely full of phlegm  Score:  1      My chest does not feel tight at all/ My chest feels very tight   Score:  1      When I walk up a hill or stairs I am bot breathless/   When I walk up a hill or stairs I am very breathless    Score:  4      I am not limited doing any activities at home/   I am very limited doing activities at home     Score:  3      I am confident leaving my home despite my lung condition/  I am not at all confident leaving my home because of my lung condition Score:  0      I sleep soundly/ I don't sleep because of my lung condition   Score:  0      I have lots of energy/ I have no energy at all     Score:  5                   Total:  14

## 2019-12-06 NOTE — CARDIO/PULMONARY
CP REHAB EXIT NOTE    Patient completed 36 of 36 visits. He progressed well while in rehab and was independent in his exercise routine. His USCD SOB Questionnaire improved from a 25 to a 22, his METS went from a 2.5 to 3.3, and his 6MWT improved by 172 meters. Medications, immunizations, and allergies were reviewed and are up to date. Mr. Swati Sorenson walks his dog for 20 minutes most days of the week but admits to not being consistent. He also states he has access to the [employer] but is also inconsistent. Encouraged patient to walk his dog most days and utilize the gym 3-5 days for 30-45 minutes per session.

## 2023-01-25 ENCOUNTER — TRANSCRIBE ORDER (OUTPATIENT)
Dept: SCHEDULING | Age: 78
End: 2023-01-25

## 2023-01-25 DIAGNOSIS — I43 CARDIOMYOPATHY IN DISEASE CLASSIFIED ELSEWHERE (HCC): ICD-10-CM

## 2023-01-25 DIAGNOSIS — I48.91 ATRIAL FIBRILLATION (HCC): ICD-10-CM

## 2023-01-25 DIAGNOSIS — G47.33 OBSTRUCTIVE SLEEP APNEA: Primary | ICD-10-CM

## 2023-01-25 DIAGNOSIS — E85.4 SENILE CARDIAC AMYLOIDOSIS (HCC): ICD-10-CM

## 2023-01-25 DIAGNOSIS — I43 SENILE CARDIAC AMYLOIDOSIS (HCC): ICD-10-CM

## 2023-01-25 DIAGNOSIS — R06.02 SHORTNESS OF BREATH: ICD-10-CM

## 2023-01-25 DIAGNOSIS — N18.31 STAGE 3A CHRONIC KIDNEY DISEASE (HCC): ICD-10-CM

## 2023-02-01 ENCOUNTER — TRANSCRIBE ORDER (OUTPATIENT)
Dept: SCHEDULING | Age: 78
End: 2023-02-01

## 2023-02-01 DIAGNOSIS — R10.30 LOWER ABDOMINAL PAIN: Primary | ICD-10-CM

## 2023-02-22 ENCOUNTER — HOSPITAL ENCOUNTER (OUTPATIENT)
Dept: CT IMAGING | Age: 78
Discharge: HOME | End: 2023-02-22
Attending: NURSE PRACTITIONER
Payer: MEDICARE

## 2023-02-22 ENCOUNTER — HOSPITAL ENCOUNTER (OUTPATIENT)
Dept: ULTRASOUND IMAGING | Age: 78
Discharge: HOME | End: 2023-02-22
Attending: INTERNAL MEDICINE
Payer: MEDICARE

## 2023-02-22 DIAGNOSIS — I43 SENILE CARDIAC AMYLOIDOSIS (HCC): ICD-10-CM

## 2023-02-22 DIAGNOSIS — R10.30 LOWER ABDOMINAL PAIN: ICD-10-CM

## 2023-02-22 DIAGNOSIS — G47.33 OBSTRUCTIVE SLEEP APNEA: ICD-10-CM

## 2023-02-22 DIAGNOSIS — N18.31 STAGE 3A CHRONIC KIDNEY DISEASE (HCC): ICD-10-CM

## 2023-02-22 DIAGNOSIS — I43 CARDIOMYOPATHY IN DISEASE CLASSIFIED ELSEWHERE (HCC): ICD-10-CM

## 2023-02-22 DIAGNOSIS — R06.02 SHORTNESS OF BREATH: ICD-10-CM

## 2023-02-22 DIAGNOSIS — I48.91 ATRIAL FIBRILLATION (HCC): ICD-10-CM

## 2023-02-22 DIAGNOSIS — E85.4 SENILE CARDIAC AMYLOIDOSIS (HCC): ICD-10-CM

## 2023-02-22 LAB — CREAT BLD-MCNC: 1.5 MG/DL (ref 0.6–1.3)

## 2023-02-22 PROCEDURE — 82565 ASSAY OF CREATININE: CPT

## 2023-02-22 PROCEDURE — 74011000636 HC RX REV CODE- 636: Performed by: NURSE PRACTITIONER

## 2023-02-22 PROCEDURE — 74177 CT ABD & PELVIS W/CONTRAST: CPT

## 2023-02-22 PROCEDURE — 76770 US EXAM ABDO BACK WALL COMP: CPT

## 2023-02-22 RX ADMIN — IOPAMIDOL 100 ML: 755 INJECTION, SOLUTION INTRAVENOUS at 14:39

## 2025-07-18 ENCOUNTER — TRANSCRIBE ORDERS (OUTPATIENT)
Facility: HOSPITAL | Age: 80
End: 2025-07-18

## 2025-07-18 DIAGNOSIS — G47.33 OBSTRUCTIVE SLEEP APNEA (ADULT) (PEDIATRIC): Primary | ICD-10-CM

## 2025-07-18 DIAGNOSIS — Z78.9 NON-SMOKER: ICD-10-CM

## 2025-07-18 DIAGNOSIS — J30.9 ALLERGIC RHINITIS, UNSPECIFIED SEASONALITY, UNSPECIFIED TRIGGER: ICD-10-CM

## 2025-07-18 DIAGNOSIS — R51.9 FACIAL PAIN: ICD-10-CM

## 2025-08-04 ENCOUNTER — HOSPITAL ENCOUNTER (OUTPATIENT)
Facility: HOSPITAL | Age: 80
Discharge: HOME | End: 2025-08-07
Attending: OTOLARYNGOLOGY
Payer: MEDICARE

## 2025-08-04 DIAGNOSIS — R51.9 FACIAL PAIN: ICD-10-CM

## 2025-08-04 DIAGNOSIS — J30.9 ALLERGIC RHINITIS, UNSPECIFIED SEASONALITY, UNSPECIFIED TRIGGER: ICD-10-CM

## 2025-08-04 DIAGNOSIS — G47.33 OBSTRUCTIVE SLEEP APNEA (ADULT) (PEDIATRIC): ICD-10-CM

## 2025-08-04 DIAGNOSIS — Z78.9 NON-SMOKER: ICD-10-CM

## 2025-08-04 PROCEDURE — 70450 CT HEAD/BRAIN W/O DYE: CPT

## 2025-08-08 ENCOUNTER — TRANSCRIBE ORDERS (OUTPATIENT)
Facility: HOSPITAL | Age: 80
End: 2025-08-08

## 2025-08-08 DIAGNOSIS — I27.20 PULMONARY HTN (HCC): Primary | ICD-10-CM
